# Patient Record
Sex: MALE | Employment: FULL TIME | URBAN - METROPOLITAN AREA
[De-identification: names, ages, dates, MRNs, and addresses within clinical notes are randomized per-mention and may not be internally consistent; named-entity substitution may affect disease eponyms.]

---

## 2023-07-10 ENCOUNTER — HOSPITAL ENCOUNTER (OUTPATIENT)
Dept: PHYSICAL THERAPY | Age: 58
Setting detail: RECURRING SERIES
Discharge: HOME | End: 2023-07-13
Payer: COMMERCIAL

## 2023-07-10 PROCEDURE — 97161 PT EVAL LOW COMPLEX 20 MIN: CPT

## 2023-07-10 PROCEDURE — 97140 MANUAL THERAPY 1/> REGIONS: CPT

## 2023-07-10 NOTE — PROGRESS NOTES
stimulation  Treatment/Session Summary:    >Treatment Assessment:  Mr. Sue Dale tolerated the session well. He did not have any adverse reaction to needing. Reviewed his plan of care and home program.  Communication/Consultation:  None todayNone today  Equipment provided today:  None  Recommendations/Intent for next treatment session: Next visit will focus on progressing his plan of care.     >Total Treatment Billable Duration:   manual 10 min, evaluation 35 min  Time In: 0920  Time Out: 36 [facility], PT       Charge Capture  }Post Session Pain  PT Visit Info  [facility] Portal  MD Guidelines  Scanned Media  Benefits  MyChart    Future Appointments   Date Time Provider [facility] [address]   7/17/2023 10:00 AM Glynn Wells PT [facility]   7/28/2023 11:00 AM Glynn Wells PT [facility]   7/31/2023 10:30 AM Glynn Wells PT [facility]   8/7/2023 10:00 AM Glynn Wells PT [facility]

## 2023-07-10 NOTE — THERAPY EVALUATION
Anup Bile  : 1965  Primary: Sammy Pleitez [facility])  Secondary:  [employer]  [address]   [address] [place] [address] Javier Sorto [facility] 592 [address] [facility] [address]  Phone: [phone]  Fax: [phone] Plan Frequency: 1x/wk  Plan of Care/Certification Expiration Date: 10/06/23      PT Visit Info:  Plan Frequency: 1x/wk  Plan of Care/Certification Expiration Date: 10/06/23      Visit Count:  1                OUTPATIENT PHYSICAL THERAPY:             OP NOTE TYPE: Initial Assessment 7/10/2023               Episode (L leg pain) Appt Desk         Treatment Diagnosis:  PAIN IN LIMB, UNSPECIFIED, LEG M79.605; Pain in left hip (M25.552); Stiffness of left hip, not elsewhere classified (J07.408)  Medical/Referring Diagnosis:  Osteonecrosis, unspecified [M87.9]  Referring Physician:  Gerry Blue MD MD Orders:  PT Eval and Treat   Return MD Appt:    Date of Onset:       Allergies:  Patient has no allergy information on record. Restrictions/Precautions:           Medications Last Reviewed:  7/10/2023     SUBJECTIVE   History of Injury/Illness (Reason for Referral):  Reports that he has been diagnosed with AVN of the left hip. He is feeling a lot of pain in the left thigh. Once it flares up, it can take several days or a week to calm down. He does not take any pain medication. He has a hx of achilles repair on L and R ankle reconstruction. Patient Stated Goal(s):  \"to return to full activity with minimal symptoms\"  Initial:      /10 Post Session:      /10  Past Medical History/Comorbidities:   Mr. Delaney Conway  has no past medical history on file. Mr. Delaney Conway  has no past surgical history on file.   Social History/Living Environment:   Type of Home: House  Home Layout: Two level       Prior Level of Function/Work/Activity:   Prior level of function: Independent  Occupation: Full time employment         Learning:   Does the patient/guardian have any barriers to learning?: No barriers  Will there be a co-learner?: No  What is the preferred language

## 2023-07-17 ENCOUNTER — HOSPITAL ENCOUNTER (OUTPATIENT)
Dept: PHYSICAL THERAPY | Age: 58
Setting detail: RECURRING SERIES
End: 2023-07-17
Payer: COMMERCIAL

## 2023-07-17 ASSESSMENT — PAIN SCALES - GENERAL: PAINLEVEL_OUTOF10: 4

## 2023-07-28 ENCOUNTER — HOSPITAL ENCOUNTER (OUTPATIENT)
Dept: PHYSICAL THERAPY | Age: 58
Setting detail: RECURRING SERIES
Discharge: HOME | End: 2023-07-31
Payer: COMMERCIAL

## 2023-07-28 PROCEDURE — 97140 MANUAL THERAPY 1/> REGIONS: CPT

## 2023-07-28 PROCEDURE — 97110 THERAPEUTIC EXERCISES: CPT

## 2023-07-28 ASSESSMENT — PAIN SCALES - GENERAL: PAINLEVEL_OUTOF10: 0

## 2023-07-28 NOTE — PROGRESS NOTES
Martita Torrez  : 1965  Primary: Luli Pleitez [facility])  Secondary:  [facility] [employer]  [address] [place] [address]  Phone: [phone]  Fax: [phone] Plan Frequency: 1x/wk    Plan of Care/Certification Expiration Date: 10/06/23      >PT Visit Info:  Plan Frequency: 1x/wk  Plan of Care/Certification Expiration Date: 10/06/23      Visit Count:  2    OUTPATIENT PHYSICAL THERAPY:OP NOTE TYPE: Treatment Note 2023       Episode  }Appt Desk             Treatment Diagnosis:  PAIN IN LIMB, UNSPECIFIED, LEG M79.605; Pain in left hip (M25.552); Stiffness of left hip, not elsewhere classified (U03.032)  Medical/Referring Diagnosis:  Osteonecrosis, unspecified [M87.9]  Referring Physician:  Ree Moffett MD MD Orders:  PT Eval and Treat   Date of Onset:  No data recorded   Allergies:   Patient has no allergy information on record. Restrictions/Precautions:  No data recordedNo data recorded     Interventions Planned (Treatment may consist of any combination of the following):    Current Treatment Recommendations: Strengthening; ROM; Dry needling; Home exercise program; Manual     >Subjective Comments:  Reports that he did well after the last session. States he has felt it some after playing soccer but never got to a full flare up.  >Initial:     0 (at rest)/10>Post Session:       0 (at rest)/10  Medications Last Reviewed:  2023  Updated Objective Findings:  None Today  Treatment   THERAPEUTIC EXERCISE: (15 minutes):    Exercises per grid below to improve mobility and strength.      Date:  7-10-23 Date:  23   Activity/Exercise Parameters Parameters   Education Reviewed HEP and plan of care Reviewed HEP  Discussed his footwear   Side-lying hip abd reviewed Elenore Palin band,10x, 2 sets   Hip flexor/quad stretch 60 sec Half kneeling, 60 sec   SL bridge  10x     Manual Therapy (30 min): done to improve soft tissue and joint mobility in order to improve ROM, muscle tone, and decrease

## 2023-07-31 ENCOUNTER — HOSPITAL ENCOUNTER (OUTPATIENT)
Dept: PHYSICAL THERAPY | Age: 58
Setting detail: RECURRING SERIES
Discharge: HOME | End: 2023-08-03
Payer: COMMERCIAL

## 2023-07-31 PROCEDURE — 97110 THERAPEUTIC EXERCISES: CPT

## 2023-07-31 PROCEDURE — 97140 MANUAL THERAPY 1/> REGIONS: CPT

## 2023-07-31 ASSESSMENT — PAIN SCALES - GENERAL: PAINLEVEL_OUTOF10: 0

## 2023-07-31 NOTE — PROGRESS NOTES
Zhane Palafox  : 1965  Primary: Shelly Pleitez [facility])  Secondary:  [facility] [employer]  [address] Emma Henley [address] [place] [address]  Phone: [phone]  Fax: [phone] Plan Frequency: 1x/wk    Plan of Care/Certification Expiration Date: 10/06/23      >PT Visit Info:  Plan Frequency: 1x/wk  Plan of Care/Certification Expiration Date: 10/06/23      Visit Count:  3    OUTPATIENT PHYSICAL THERAPY:OP NOTE TYPE: Treatment Note 2023       Episode  }Appt Desk             Treatment Diagnosis:  PAIN IN LIMB, UNSPECIFIED, LEG M79.605; Pain in left hip (M25.552); Stiffness of left hip, not elsewhere classified (V21.166)  Medical/Referring Diagnosis:  Osteonecrosis, unspecified [M87.9]  Referring Physician:  Vee Torrez MD MD Orders:  PT Eval and Treat   Date of Onset:  No data recorded   Allergies:   Patient has no allergy information on record. Restrictions/Precautions:  No data recordedNo data recorded     Interventions Planned (Treatment may consist of any combination of the following):    Current Treatment Recommendations: Strengthening; ROM; Dry needling; Home exercise program; Manual     >Subjective Comments:  reports that he is doing well. No pain from last session. has not had a chance to play soccer or run over the wknd. Will try on Wednesday. >Initial:     0/10>Post Session:       0/10  Medications Last Reviewed:  2023  Updated Objective Findings:  None Today  Treatment   THERAPEUTIC EXERCISE: (15 minutes):    Exercises per grid below to improve mobility and strength.      Date:  7-10-23 Date:  23 Date  23   Activity/Exercise Parameters Parameters    Education Reviewed HEP and plan of care Reviewed HEP  Discussed his footwear Updated HEP   Side-lying hip abd reviewed Daija Meager band,10x, 2 sets Gray band, 10x, 3 sets   Hip flexor/quad stretch 60 sec Half kneeling, 60 sec Half kneeling, 60 sec   PNF    D2 flexion pattern, 5x, 2 sets, red band   SL bridge  10x 10x, 3 sets     Manual

## 2023-08-07 ENCOUNTER — HOSPITAL ENCOUNTER (OUTPATIENT)
Dept: PHYSICAL THERAPY | Age: 58
Setting detail: RECURRING SERIES
Discharge: HOME | End: 2023-08-10
Payer: COMMERCIAL

## 2023-08-07 PROCEDURE — 97140 MANUAL THERAPY 1/> REGIONS: CPT

## 2023-08-07 PROCEDURE — 97110 THERAPEUTIC EXERCISES: CPT

## 2023-08-07 ASSESSMENT — PAIN SCALES - GENERAL: PAINLEVEL_OUTOF10: 1

## 2023-08-07 NOTE — PROGRESS NOTES
Benjamen Bile  : 1965  Primary: Sammy Pleitez [facility])  Secondary:  [facility] [employer]  [address] Catherine Rascon [place] [address]  Phone: [phone]  Fax: [phone] Plan Frequency: 1x/wk    Plan of Care/Certification Expiration Date: 10/06/23      >PT Visit Info:  Plan Frequency: 1x/wk  Plan of Care/Certification Expiration Date: 10/06/23      Visit Count:  4    OUTPATIENT PHYSICAL THERAPY:OP NOTE TYPE: Treatment Note 2023       Episode  }Appt Desk             Treatment Diagnosis:  PAIN IN LIMB, UNSPECIFIED, LEG M79.605; Pain in left hip (M25.552); Stiffness of left hip, not elsewhere classified (M20.521)  Medical/Referring Diagnosis:  Osteonecrosis, unspecified [M87.9]  Referring Physician:  Gerry Blue MD MD Orders:  PT Eval and Treat   Date of Onset:  No data recorded   Allergies:   Patient has no allergy information on record. Restrictions/Precautions:  No data recordedNo data recorded     Interventions Planned (Treatment may consist of any combination of the following):    Current Treatment Recommendations: Strengthening; ROM; Dry needling; Home exercise program; Manual     >Subjective Comments:  Reports that he played soccer yesterday and feels a little sorenes in his quad but not bad.  >Initial:     1/10>Post Session:       010  Medications Last Reviewed:  2023  Updated Objective Findings:  None Today  Treatment   THERAPEUTIC EXERCISE: (15 minutes):    Exercises per grid below to improve mobility and strength.      Date:  7-10-23 Date:  23 Date  23 Date  23   Activity/Exercise Parameters Parameters     Education Reviewed HEP and plan of care Reviewed HEP  Discussed his footwear Updated HEP Reviewed plan of care   Side-lying hip abd reviewed Glen Rung band,10x, 2 sets Gray band, 10x, 3 sets HEP   Hip flexor/quad stretch 60 sec Half kneeling, 60 sec Half kneeling, 60 sec Side-lying 60 sec, add adduction motion ot it   PNF    D2 flexion pattern, 5x, 2 sets, red band D2

## 2023-08-18 ENCOUNTER — HOSPITAL ENCOUNTER (OUTPATIENT)
Dept: PHYSICAL THERAPY | Age: 58
Setting detail: RECURRING SERIES
Discharge: HOME | End: 2023-08-21
Payer: COMMERCIAL

## 2023-08-18 PROCEDURE — 97110 THERAPEUTIC EXERCISES: CPT

## 2023-08-18 PROCEDURE — 97140 MANUAL THERAPY 1/> REGIONS: CPT

## 2023-08-18 ASSESSMENT — PAIN SCALES - GENERAL: PAINLEVEL_OUTOF10: 0

## 2023-08-18 NOTE — PROGRESS NOTES
Zhane Palafox  : 1965  Primary: Shelly Pleitez [facility])  Secondary:  [employer] [employer]  [address] Emma Henley [address]  [facility] [address]  Phone: [phone]  Fax: [phone] Plan Frequency: 1x/wk    Plan of Care/Certification Expiration Date: 10/06/23      >PT Visit Info:  Plan Frequency: 1x/wk  Plan of Care/Certification Expiration Date: 10/06/23      Visit Count:  5    OUTPATIENT PHYSICAL THERAPY:OP NOTE TYPE: Treatment Note 2023       Episode  }Appt Desk             Treatment Diagnosis:  PAIN IN LIMB, UNSPECIFIED, LEG M79.605; Pain in left hip (M25.552); Stiffness of left hip, not elsewhere classified (N77.340)  Medical/Referring Diagnosis:  Osteonecrosis, unspecified [M87.9]  Referring Physician:  Vee Torrez MD MD Orders:  PT Eval and Treat   Date of Onset:  No data recorded   Allergies:   Patient has no allergy information on record. Restrictions/Precautions:  No data recordedNo data recorded     Interventions Planned (Treatment may consist of any combination of the following):    Current Treatment Recommendations: Strengthening; ROM; Dry needling; Home exercise program; Manual     >Subjective Comments:  Reports that he is doing better overall. Played soccer on 2 occasions with minimal soreness. >Initial:     0/10>Post Session:       0/10  Medications Last Reviewed:  2023  Updated Objective Findings:  None Today  Treatment   THERAPEUTIC EXERCISE: (10 minutes):    Exercises per grid below to improve mobility and strength.      Date  23 Date  23 Date  23   Activity/Exercise      Education Updated HEP Reviewed plan of care Reviewed HEP   Side-lying hip abd Gray band, 10x, 3 sets HEP -   Hip flexor/quad stretch Half kneeling, 60 sec Side-lying 60 sec, add adduction motion ot it Lee position, 60 sec,2x   PNF  D2 flexion pattern, 5x, 2 sets, red band D2 flexion, 10x, 2 sets, manual resistance reviewed   SL bridge 10x, 3 sets HEP 20x     Manual Therapy (30 min): done to

## 2023-08-25 ENCOUNTER — HOSPITAL ENCOUNTER (OUTPATIENT)
Dept: PHYSICAL THERAPY | Age: 58
Setting detail: RECURRING SERIES
Discharge: HOME | End: 2023-08-28
Payer: COMMERCIAL

## 2023-08-25 PROCEDURE — 97140 MANUAL THERAPY 1/> REGIONS: CPT

## 2023-08-25 PROCEDURE — 97110 THERAPEUTIC EXERCISES: CPT

## 2023-08-25 ASSESSMENT — PAIN SCALES - GENERAL: PAINLEVEL_OUTOF10: 0

## 2023-08-25 NOTE — PROGRESS NOTES
pattern, 5x, 2 sets, red band D2 flexion, 10x, 2 sets, manual resistance reviewed 10x   SL bridge 10x, 3 sets HEP 20x 30x   Ankle eversion    10x     Manual Therapy (30 min): done to improve soft tissue and joint mobility in order to improve ROM, muscle tone, and decrease pain  Soft tissue mobilization to vastus lateralis, used edge tool, cupping and manually    Dry needling (5 min): done to improve muscle activation and relax muscle tone  Vastus lateralis, done with point stimulation  Treatment/Session Summary:    >Treatment Assessment:  Mr. Marinelli tolerated the session well. His quad tone continues to improve. Discussed using a small heel lift for his R ankle to help w/ closed chain DF. Communication/Consultation:  None todayNone today  Equipment provided today:  None  Recommendations/Intent for next treatment session: Next visit will focus on progressing his plan of care. >Total Treatment Billable Duration:   manual 30 min, TE-10 min  Time In: 1110  Time Out: 1200    Chata Zimmerman, PT       Charge Capture  }Post Session Pain  PT Visit Info  [employer] Portal  MD Guidelines  Scanned Media  Benefits  MyChart    No future appointments.

## 2023-09-22 ENCOUNTER — HOSPITAL ENCOUNTER (OUTPATIENT)
Dept: PHYSICAL THERAPY | Age: 58
Setting detail: RECURRING SERIES
Discharge: HOME | End: 2023-09-25
Payer: COMMERCIAL

## 2023-09-22 PROCEDURE — 97110 THERAPEUTIC EXERCISES: CPT

## 2023-09-22 PROCEDURE — 97140 MANUAL THERAPY 1/> REGIONS: CPT

## 2023-09-22 ASSESSMENT — PAIN SCALES - GENERAL: PAINLEVEL_OUTOF10: 1

## 2023-09-22 NOTE — PROGRESS NOTES
mobility in order to improve ROM, muscle tone, and decrease pain  Soft tissue mobilization to vastus lateralis, used edge tool, cupping and manually  Achilles elongation on R side    Dry needling (5 min): done to improve muscle activation and relax muscle tone  Vastus lateralis, done with point stimulation  Treatment/Session Summary:    >Treatment Assessment:  Mr. Sue Dale did well during the session today. Discussed progression of mobility work and strengthening the achilles. His lateral thigh still had several trigger points but is progressing. Reviewed HEP. Communication/Consultation:  None todayNone today  Equipment provided today:  None  Recommendations/Intent for next treatment session: Next visit will focus on progressing his plan of care.     >Total Treatment Billable Duration:   manual 25 min, TE-10 min  Time In: 1015  Time Out: [address] [place] [address], PT       Charge Capture  }Post Session Pain  PT Visit Info  [employer] Portal  MD Guidelines  Scanned Media  Benefits  MyChart    Future Appointments   Date Time Provider [address]   9/29/2023 10:15 AM Glynn Wells PT [facility]   10/6/2023 10:15 AM Glynn Wells PT [facility]

## 2023-09-29 ENCOUNTER — APPOINTMENT (OUTPATIENT)
Dept: PHYSICAL THERAPY | Age: 58
End: 2023-09-29
Payer: COMMERCIAL

## 2023-10-06 ENCOUNTER — HOSPITAL ENCOUNTER (OUTPATIENT)
Dept: PHYSICAL THERAPY | Age: 58
Setting detail: RECURRING SERIES
Discharge: HOME | End: 2023-10-09
Payer: COMMERCIAL

## 2023-10-06 PROCEDURE — 97110 THERAPEUTIC EXERCISES: CPT

## 2023-10-06 PROCEDURE — 97140 MANUAL THERAPY 1/> REGIONS: CPT

## 2023-10-06 ASSESSMENT — PAIN SCALES - GENERAL: PAINLEVEL_OUTOF10: 0

## 2023-10-06 NOTE — THERAPY EVALUATION
Ela Smith  : 1965  Primary: Vincent Pleitez [facility])  Secondary:  [employer] [employer]  [address] Andra Tee [address] [place] [address]  Phone: [phone]  Fax: [phone] Plan Frequency: 1x/wk  Plan of Care/Certification Expiration Date: 10/06/23      PT Visit Info:  Plan Frequency: 1x/wk  Plan of Care/Certification Expiration Date: 10/06/23      Visit Count:  8                OUTPATIENT PHYSICAL THERAPY:             OP NOTE TYPE: Initial Assessment 10/6/2023               Episode (L leg pain) Appt Desk         Treatment Diagnosis:  PAIN IN LIMB, UNSPECIFIED, LEG M79.605; Pain in left hip (M25.552); Stiffness of left hip, not elsewhere classified (D51.884)  Medical/Referring Diagnosis:  Osteonecrosis, unspecified [M87.9]  Referring Physician:  Quentin Quinonez MD MD Orders:  PT Eval and Treat   Return MD Appt:    Date of Onset:       Allergies:  Patient has no allergy information on record. Restrictions/Precautions:           Medications Last Reviewed:  10/6/2023     SUBJECTIVE   History of Injury/Illness (Reason for Referral):  Reports that he has been diagnosed with AVN of the left hip. He is feeling a lot of pain in the left thigh. Once it flares up, it can take several days or a week to calm down. He does not take any pain medication. He has a hx of achilles repair on L and R ankle reconstruction. Patient Stated Goal(s):  \"to return to full activity with minimal symptoms\"  Initial:      /10 Post Session:      /10  Past Medical History/Comorbidities:   Mr. Taon Perdomo  has no past medical history on file. Mr. Toan Perdomo  has no past surgical history on file.   Social History/Living Environment:   Type of Home: House  Home Layout: Two level       Prior Level of Function/Work/Activity:   Prior level of function: Independent  Occupation: Full time employment         Learning:   Does the patient/guardian have any barriers to learning?: No barriers  Will there be a co-learner?: No  What is the preferred language

## 2023-10-06 NOTE — PROGRESS NOTES
Elissa Halsted  : 1965  Primary: Alexandria Pleitez [facility])  Secondary:  O MILLENNIUM  [address] Birdie Camacho [address] [place] [address]  Phone: [phone]  Fax: [phone] Plan Frequency: 1x/wk    Plan of Care/Certification Expiration Date: 10/06/23      >PT Visit Info:  Plan Frequency: 1x/wk  Plan of Care/Certification Expiration Date: 10/06/23      Visit Count:  8    OUTPATIENT PHYSICAL THERAPY:OP NOTE TYPE: Treatment Note 10/6/2023       Episode  }Appt Desk             Treatment Diagnosis:  PAIN IN LIMB, UNSPECIFIED, LEG M79.605; Pain in left hip (M25.552); Stiffness of left hip, not elsewhere classified (B61.648)  Medical/Referring Diagnosis:  Osteonecrosis, unspecified [M87.9]  Referring Physician:  Grace Strickland MD MD Orders:  PT Eval and Treat   Date of Onset:  No data recorded   Allergies:   Patient has no allergy information on record. Restrictions/Precautions:  No data recordedNo data recorded     Interventions Planned (Treatment may consist of any combination of the following):    Current Treatment Recommendations: Strengthening; ROM; Dry needling; Home exercise program; Manual     >Subjective Comments:  Reports that his lateral leg is feeling better. R achilles stil bothers him at times so he has not played soccer but has been able to work out without pain. >Initial:     0/10>Post Session:       0/10  Medications Last Reviewed:  10/6/2023  Updated Objective Findings:  None Today  Treatment   THERAPEUTIC EXERCISE: (10 minutes):    Exercises per grid below to improve mobility and strength.      Date  23 Date  23 Date  23 Date  10-6-23   Activity/Exercise       Education Reviewed HEP Reviewed HEP  Issued a heel lift for R ankle Reviewed HEP Reviewed HEP   Side-lying hip abd -  - -          Hip flexor/quad stretch Lee position, 60 sec,2x Lee position, 60 sec,2x Side-lying,60 sec, with femoral adduction Side-lying,60 sec, with femoral adduction   PNF  reviewed 10x - Not done

## 2023-10-20 ENCOUNTER — HOSPITAL ENCOUNTER (OUTPATIENT)
Dept: PHYSICAL THERAPY | Age: 58
Setting detail: RECURRING SERIES
Discharge: HOME | End: 2023-10-23
Payer: COMMERCIAL

## 2023-10-20 PROCEDURE — 97110 THERAPEUTIC EXERCISES: CPT

## 2023-10-20 PROCEDURE — 97140 MANUAL THERAPY 1/> REGIONS: CPT

## 2023-10-20 ASSESSMENT — PAIN SCALES - GENERAL: PAINLEVEL_OUTOF10: 2

## 2023-10-20 NOTE — PROGRESS NOTES
Alondra Reach  : 1965  Primary: Fely Pleitez [facility])  Secondary:  O M[employer]UM  [address] Curtis Garcia  [place] Darby[address] [address]  Phone: [phone]  Fax: [phone] Plan Frequency: 1x/wk    Plan of Care/Certification Expiration Date: 24      >PT Visit Info:  Plan Frequency: 1x/wk  Plan of Care/Certification Expiration Date: 24      Visit Count:  9    OUTPATIENT PHYSICAL THERAPY:OP NOTE TYPE: Treatment Note 10/20/2023       Episode  }Appt Desk             Treatment Diagnosis:  PAIN IN LIMB, UNSPECIFIED, LEG M79.605; Pain in left hip (M25.552); Stiffness of left hip, not elsewhere classified (A19.991)  Medical/Referring Diagnosis:  Osteonecrosis, unspecified [M87.9]  Referring Physician:  Evette Hernandez MD MD Orders:  PT Eval and Treat   Date of Onset:  No data recorded   Allergies:   Patient has no allergy information on record. Restrictions/Precautions:  No data recordedNo data recorded     Interventions Planned (Treatment may consist of any combination of the following):    Current Treatment Recommendations: Strengthening; ROM; Dry needling; Home exercise program; Manual     >Subjective Comments:  Reports that he played soccer on 3 occasions since our last session. This pas  he had a bit a of set back after the game and had more tightness. It did resolve by Tuesday and he played again on Wednesday with less pain afterwards. Still feesl some tightness today. >Initial:     2/10>Post Session:       1/10  Medications Last Reviewed:  10/20/2023  Updated Objective Findings:  None Today  Treatment   THERAPEUTIC EXERCISE: (10 minutes):    Exercises per grid below to improve mobility and strength.      Date  23 Date  10-6-23 Date  10-20-23   Activity/Exercise      Education Reviewed HEP Reviewed HEP Reviewed HEP   Side-lying hip abd - -          Hip flexor/quad stretch Side-lying,60 sec, with femoral adduction Side-lying,60 sec, with femoral adduction Side-lying,60 sec, with

## 2023-10-27 ENCOUNTER — HOSPITAL ENCOUNTER (OUTPATIENT)
Dept: PHYSICAL THERAPY | Age: 58
Setting detail: RECURRING SERIES
Discharge: HOME | End: 2023-10-30
Payer: COMMERCIAL

## 2023-10-27 PROCEDURE — 97140 MANUAL THERAPY 1/> REGIONS: CPT

## 2023-10-27 PROCEDURE — 97110 THERAPEUTIC EXERCISES: CPT

## 2023-10-27 NOTE — PROGRESS NOTES
Delta Parent  : 1965  Primary: Sylvia Pleitez [facility])  Secondary:  [facility] [employer]  [address] Arnel Donnelly [place] [address]  Phone: [phone]  Fax: [phone] Plan Frequency: 1x/wk    Plan of Care/Certification Expiration Date: 24      >PT Visit Info:  Plan Frequency: 1x/wk  Plan of Care/Certification Expiration Date: 24      Visit Count:  10    OUTPATIENT PHYSICAL THERAPY:OP NOTE TYPE: Treatment Note 10/27/2023       Episode  }Appt Desk             Treatment Diagnosis:  PAIN IN LIMB, UNSPECIFIED, LEG M79.605; Pain in left hip (M25.552); Stiffness of left hip, not elsewhere classified (H40.631)  Medical/Referring Diagnosis:  Osteonecrosis, unspecified [M87.9]  Referring Physician:  Guru Swain MD MD Orders:  PT Eval and Treat   Date of Onset:  No data recorded   Allergies:   Patient has no allergy information on record. Restrictions/Precautions:  No data recordedNo data recorded     Interventions Planned (Treatment may consist of any combination of the following):    Current Treatment Recommendations: Strengthening; ROM; Dry needling; Home exercise program; Manual     >Subjective Comments:  Reports that he played soccer 1x this wk and felt relatively okay afterwards. Just had some tightness  >Initial:     1/10>Post Session:       1/10  Medications Last Reviewed:  10/27/2023  Updated Objective Findings:  None Today  Treatment   THERAPEUTIC EXERCISE: (10 minutes):    Exercises per grid below to improve mobility and strength.      Date  10-6-23 Date  10-20-23 Date  10-27-23   Activity/Exercise      Education Reviewed HEP Reviewed HEP Reviewed HEP   Side-lying hip abd -           Hip flexor/quad stretch Side-lying,60 sec, with femoral adduction Side-lying,60 sec, with femoral adduction Side-lying,60 sec, with femoral adduction   PNF  Not done today -     bridge - Double leg bridge, max distance to stretch hip flexors Double leg bridge, max distance to stretch hip flexors   Ankle

## 2023-10-30 ASSESSMENT — PAIN SCALES - GENERAL: PAINLEVEL_OUTOF10: 1

## 2023-11-10 ENCOUNTER — HOSPITAL ENCOUNTER (OUTPATIENT)
Dept: PHYSICAL THERAPY | Age: 58
Setting detail: RECURRING SERIES
Discharge: HOME | End: 2023-11-13
Payer: COMMERCIAL

## 2023-11-10 PROCEDURE — 97110 THERAPEUTIC EXERCISES: CPT

## 2023-11-10 PROCEDURE — 97140 MANUAL THERAPY 1/> REGIONS: CPT

## 2023-11-10 NOTE — PROGRESS NOTES
Jackie Brownejason  : 1965  Primary: Rod Sessions [facility])  Secondary:  [employer]  [address]   1 [facility] Javier Sorto [phone]  [place] [address]  Phone: [phone]  Fax: [phone] Plan Frequency: 1x/wk    Plan of Care/Certification Expiration Date: 24      >PT Visit Info:  Plan Frequency: 1x/wk  Plan of Care/Certification Expiration Date: 24      Visit Count:  11    OUTPATIENT PHYSICAL THERAPY:OP NOTE TYPE: Treatment Note 11/10/2023       Episode  }Appt Desk             Treatment Diagnosis:  PAIN IN LIMB, UNSPECIFIED, LEG M79.605; Pain in left hip (M25.552); Stiffness of left hip, not elsewhere classified (V91.803)  Medical/Referring Diagnosis:  Osteonecrosis, unspecified [M87.9]  Referring Physician:  Myron Niño MD MD Orders:  PT Eval and Treat   Date of Onset:  No data recorded   Allergies:   Patient has no allergy information on record. Restrictions/Precautions:  No data recordedNo data recorded     Interventions Planned (Treatment may consist of any combination of the following):    Current Treatment Recommendations: Strengthening; ROM; Dry needling; Home exercise program; Manual     >Subjective Comments:  Reports that he is doing well. States he has been playing and working out with minimal symptoms. >Initial:     1/10>Post Session:       1/10  Medications Last Reviewed:  11/10/2023  Updated Objective Findings:  None Today  Treatment   THERAPEUTIC EXERCISE: (10 minutes):    Exercises per grid below to improve mobility and strength.      Date  10-20-23 Date  10-27-23 Date  11-10-23   Activity/Exercise      Education Reviewed HEP Reviewed HEP Reviewed HEP   Side-lying hip abd            Hip flexor/quad stretch Side-lying,60 sec, with femoral adduction Side-lying,60 sec, with femoral adduction Side-lying,60 sec, with femoral adduction    bridge Double leg bridge, max distance to stretch hip flexors Double leg bridge, max distance to stretch hip flexors Double leg bridge, max distance to

## 2023-11-17 ASSESSMENT — PAIN SCALES - GENERAL: PAINLEVEL_OUTOF10: 1

## 2024-09-11 ENCOUNTER — HOSPITAL ENCOUNTER (OUTPATIENT)
Dept: PHYSICAL THERAPY | Age: 59
Setting detail: RECURRING SERIES
Discharge: HOME | End: 2024-09-14
Payer: COMMERCIAL

## 2024-09-11 PROCEDURE — 97140 MANUAL THERAPY 1/> REGIONS: CPT

## 2024-09-11 PROCEDURE — 97161 PT EVAL LOW COMPLEX 20 MIN: CPT

## 2024-09-20 ENCOUNTER — HOSPITAL ENCOUNTER (OUTPATIENT)
Dept: PHYSICAL THERAPY | Age: 59
Setting detail: RECURRING SERIES
Discharge: HOME | End: 2024-09-23
Payer: COMMERCIAL

## 2024-09-20 PROCEDURE — 97110 THERAPEUTIC EXERCISES: CPT

## 2024-09-20 PROCEDURE — 97140 MANUAL THERAPY 1/> REGIONS: CPT

## 2024-09-20 ASSESSMENT — PAIN SCALES - GENERAL: PAINLEVEL_OUTOF10: 2

## 2024-09-25 ENCOUNTER — HOSPITAL ENCOUNTER (OUTPATIENT)
Dept: PHYSICAL THERAPY | Age: 59
Setting detail: RECURRING SERIES
Discharge: HOME | End: 2024-09-28
Payer: COMMERCIAL

## 2024-09-25 PROCEDURE — 97140 MANUAL THERAPY 1/> REGIONS: CPT

## 2024-09-25 PROCEDURE — 97110 THERAPEUTIC EXERCISES: CPT
